# Patient Record
Sex: FEMALE | Race: WHITE | NOT HISPANIC OR LATINO | URBAN - METROPOLITAN AREA
[De-identification: names, ages, dates, MRNs, and addresses within clinical notes are randomized per-mention and may not be internally consistent; named-entity substitution may affect disease eponyms.]

---

## 2020-07-25 ENCOUNTER — TELEPHONE ENCOUNTER (OUTPATIENT)
Dept: URBAN - METROPOLITAN AREA CLINIC 13 | Facility: CLINIC | Age: 65
End: 2020-07-25

## 2020-07-26 ENCOUNTER — TELEPHONE ENCOUNTER (OUTPATIENT)
Dept: URBAN - METROPOLITAN AREA CLINIC 13 | Facility: CLINIC | Age: 65
End: 2020-07-26

## 2020-07-26 RX ORDER — LISINOPRIL 20 MG/1
TAKE 1 TABLET DAILY FOR BLOOD PRESSURE TABLET ORAL
Refills: 0 | Status: ACTIVE | COMMUNITY
Start: 2017-11-29

## 2021-12-06 ENCOUNTER — TELEPHONE (OUTPATIENT)
Dept: FAMILY MEDICINE CLINIC | Facility: CLINIC | Age: 66
End: 2021-12-06

## 2021-12-06 NOTE — TELEPHONE ENCOUNTER
PATIENT WOULD LIKE A CALL BACK FROM [facility] TO DISCUSS SCHEDULING AN APPOINTMENT WITH DR. SILVA AND HER BLOOD PRESSURE CONCERNS.  PATIENT IS AVAILABLE TODAY BETWEEN 1:30 AND 3:00 PM TODAY AND ANY TIME  BEFORE NOON TOMORROW [phone].